# Patient Record
(demographics unavailable — no encounter records)

---

## 2024-12-31 NOTE — PHYSICAL EXAM
[General Appearance - Well Developed] : well developed [Normal Appearance] : normal appearance [Well Groomed] : well groomed [General Appearance - Well Nourished] : well nourished [No Deformities] : no deformities [General Appearance - In No Acute Distress] : no acute distress [Normal Conjunctiva] : the conjunctiva exhibited no abnormalities [Eyelids - No Xanthelasma] : the eyelids demonstrated no xanthelasmas [Normal Oral Mucosa] : normal oral mucosa [No Oral Pallor] : no oral pallor [No Oral Cyanosis] : no oral cyanosis [Respiration, Rhythm And Depth] : normal respiratory rhythm and effort [Exaggerated Use Of Accessory Muscles For Inspiration] : no accessory muscle use [Auscultation Breath Sounds / Voice Sounds] : lungs were clear to auscultation bilaterally [Heart Rate And Rhythm] : heart rate and rhythm were normal [Arterial Pulses Normal] : the arterial pulses were normal [Edema] : no peripheral edema present [Abdomen Soft] : soft [Abdomen Tenderness] : non-tender [Abdomen Mass (___ Cm)] : no abdominal mass palpated [Abnormal Walk] : normal gait [Gait - Sufficient For Exercise Testing] : the gait was sufficient for exercise testing [Nail Clubbing] : no clubbing of the fingernails [Cyanosis, Localized] : no localized cyanosis [Petechial Hemorrhages (___cm)] : no petechial hemorrhages [Skin Color & Pigmentation] : normal skin color and pigmentation [] : no rash [No Venous Stasis] : no venous stasis [Skin Lesions] : no skin lesions [No Skin Ulcers] : no skin ulcer [No Xanthoma] : no  xanthoma was observed [Oriented To Time, Place, And Person] : oriented to person, place, and time [Affect] : the affect was normal [Mood] : the mood was normal [No Anxiety] : not feeling anxious [Normal Rate] : normal [Rhythm Regular] : regular [No Murmur] : no murmurs heard [FreeTextEntry1] : No JVD  [Rt] : no varicose veins of the right leg [Lt] : no varicose veins of the left leg

## 2024-12-31 NOTE — REASON FOR VISIT
Patient has pending appointment scheduled to 1/11/16 [Follow-Up - Clinic] : a clinic follow-up of [Hyperlipidemia] : hyperlipidemia [Mitral Regurgitation] : mitral regurgitation [FreeTextEntry1] : BP follow up

## 2024-12-31 NOTE — CARDIOLOGY SUMMARY
[___] : [unfilled] [de-identified] : 9- NSR RSR' in V1 and V2 NS ST-T change.  3- Sinus tachycardia RSR' in V1 and V2  3- NSR NS ST-T change  11- NSR RSR' in v1  and V2  12- ST RSR' in V1  [de-identified] : 4-4-2022 Average HR 68 Max 115  Min 52  [de-identified] :  ETT Echo Completed 4 minutes and 45 seconds No echo ischemia . Nondiagnostic ECG response .  Normal LV sysotlic function mild MVP mild TR RVSP was 20 mmhg

## 2024-12-31 NOTE — ASSESSMENT
[FreeTextEntry1] : The patient has had no chest pain or SOB . BP improves when allowed to relax in the office . She continues to smoke despite risks fo MI CVA and CA   The patient has had a cT scan of the chest which showed multiple nodules which were stable but still has to see pulmonary.  . She is known to have pleural plagues as well. Advised her to see pulmnary and the importance of this .  She does have CAD seen as coronary artery calcium .She has no symptoms but has unxplained sinus tachycardia

## 2024-12-31 NOTE — HISTORY OF PRESENT ILLNESS
[FreeTextEntry1] : The patient has been feeling well. No chest pain or SOB .  She has lung nodules on CT scan of the chest . She is going to follow up wiht pulmonologist although has not seen one since last year . The patient has  had coronary artery calcification s . She is still smoking  and knows improtance of smoking cessation

## 2025-06-24 NOTE — HISTORY OF PRESENT ILLNESS
[FreeTextEntry1] : The patient has been feeling well. No chest pain or SOB .  She has lung nodules on CT scan of the chest . She is going to follow up with pulmonologist although has not seen one since last year . The patient has  had coronary artery calcification s . She is still smoking  and knows importance of smoking cessation . She has not seen pulmonary but has appointment coming up. Nuclear stress test showed no ischemia

## 2025-06-24 NOTE — PHYSICAL EXAM
[General Appearance - Well Developed] : well developed [Normal Appearance] : normal appearance [Well Groomed] : well groomed [General Appearance - Well Nourished] : well nourished [No Deformities] : no deformities [General Appearance - In No Acute Distress] : no acute distress [Normal Conjunctiva] : the conjunctiva exhibited no abnormalities [Eyelids - No Xanthelasma] : the eyelids demonstrated no xanthelasmas [Normal Oral Mucosa] : normal oral mucosa [No Oral Pallor] : no oral pallor [No Oral Cyanosis] : no oral cyanosis [Respiration, Rhythm And Depth] : normal respiratory rhythm and effort [Exaggerated Use Of Accessory Muscles For Inspiration] : no accessory muscle use [Auscultation Breath Sounds / Voice Sounds] : lungs were clear to auscultation bilaterally [Heart Rate And Rhythm] : heart rate and rhythm were normal [Edema] : no peripheral edema present [Arterial Pulses Normal] : the arterial pulses were normal [Abdomen Soft] : soft [Abdomen Tenderness] : non-tender [Abdomen Mass (___ Cm)] : no abdominal mass palpated [Abnormal Walk] : normal gait [Gait - Sufficient For Exercise Testing] : the gait was sufficient for exercise testing [Nail Clubbing] : no clubbing of the fingernails [Cyanosis, Localized] : no localized cyanosis [Petechial Hemorrhages (___cm)] : no petechial hemorrhages [Skin Color & Pigmentation] : normal skin color and pigmentation [] : no rash [No Venous Stasis] : no venous stasis [Skin Lesions] : no skin lesions [No Skin Ulcers] : no skin ulcer [No Xanthoma] : no  xanthoma was observed [Oriented To Time, Place, And Person] : oriented to person, place, and time [Affect] : the affect was normal [Mood] : the mood was normal [No Anxiety] : not feeling anxious [Normal Rate] : normal [Rhythm Regular] : regular [No Murmur] : no murmurs heard [FreeTextEntry1] : No JVD  [Rt] : no varicose veins of the right leg [Lt] : no varicose veins of the left leg

## 2025-06-24 NOTE — ASSESSMENT
[FreeTextEntry1] : The patient has had no chest pain or SOB . BP improves when allowed to relax in the office . She continues to smoke despite risks fo MI CVA and CA   The patient has had a cT scan of the chest which showed multiple nodules which were stable but still has to see pulmonary. and she has an upcoming appointment  . She is known to have pleural plagues as well.   She does have CAD seen as coronary artery calcium .She has no symptoms but has unexplained sinus tachycardia. Nuclear stress test was negative for ischemia.

## 2025-06-24 NOTE — CARDIOLOGY SUMMARY
[___] : [unfilled] [de-identified] : 9- NSR RSR' in V1 and V2 NS ST-T change.  3- Sinus tachycardia RSR' in V1 and V2  3- NSR NS ST-T change  11- NSR RSR' in v1  and V2  12- ST RSR' in V1  6- NSR RSR' in v1-V3  [de-identified] : 4-4-2022 Average HR 68 Max 115  Min 52  [de-identified] : 4- Lexiscan stress test No ischemia .  [de-identified] :  ETT Echo Completed 4 minutes and 45 seconds No echo ischemia . Nondiagnostic ECG response .  Normal LV sysotlic function mild MVP mild TR RVSP was 20 mmhg

## 2025-06-24 NOTE — REASON FOR VISIT
[Follow-Up - Clinic] : a clinic follow-up of [Hyperlipidemia] : hyperlipidemia [Mitral Regurgitation] : mitral regurgitation [FreeTextEntry1] : BP follow up

## 2025-07-10 NOTE — PLAN
[Smoking Cessation Guidance Provided] : Smoking cessation guidance was provided to patient [Smoking Cessation] : smoking cessation [Smoking Cessation Pamphlet Given] : smoking cessation pamphlet not given to patient  [Outpatient Counseling Referral] : Outpatient counseling referral not made for patient [FreeTextEntry1] :  Plan: -Low Dose CT chest for lung cancer screening -Follow up with patient and her referring provider after her LDCT results have been reviewed by the multi-disciplinary clinical team

## 2025-07-10 NOTE — ASSESSMENT
[Discussed Risks and Advised to Quit Smoking] : Discussed risks and advised to quit smoking [Discussed Cessation Medication] : cessation medication was discussed [Discussed Cessation Strategies] : cessation strategies were discussed [Not Ready] : Patient is not ready for cessation intervention [Pre-contemplation] : Pre-contemplation: The patient is not thinking about quitting smoking in the next 6 months [de-identified] : Patient is not certain if she is ready at this time, will inquire with PMD when she is certain

## 2025-07-10 NOTE — HISTORY OF PRESENT ILLNESS
[Current] : Current [>=20 Pack-Years] : Twenty pack years or greater smoking history: Yes [TextBox_13] : Visit done Via Chart Review   Ms. JOHNSON BROWER is a 67 year old woman with a history of Mitral valve regurgitation and current smoker  She was seen in the office by Dr. Cormierfor review of eligibility for, as well as, discussion of Low-Dose CT lung cancer screening program -Age: 65 year Smoking status: -Current smoker -Number of pack(s) per day: 1 -Number of years smoked: 30 -Number of pack years smokin Ms. BROWER denies any signs or symptoms of lung cancer including new cough, change in cough, hemoptysis and unintentional weight loss. Ms. BROWER denies any personal history of lung cancer. No lung cancer in a 1st degree relative. Denies any history of lung disease. Denies any history of occupational exposures [PacksperDay] : 1 [N_Years] : 30 [PacksperYear] : 30